# Patient Record
Sex: FEMALE | Race: WHITE | Employment: STUDENT | URBAN - NONMETROPOLITAN AREA
[De-identification: names, ages, dates, MRNs, and addresses within clinical notes are randomized per-mention and may not be internally consistent; named-entity substitution may affect disease eponyms.]

---

## 2023-02-01 ENCOUNTER — HOSPITAL ENCOUNTER (OUTPATIENT)
Dept: INFUSION THERAPY | Age: 28
Discharge: HOME | End: 2023-02-01

## 2023-02-01 DIAGNOSIS — Z84.81 FAMILY HISTORY OF GENE MUTATION: Primary | ICD-10-CM

## 2023-02-01 DIAGNOSIS — Z80.0 FAMILY HISTORY- STOMACH CANCER: ICD-10-CM

## 2023-02-01 DIAGNOSIS — Z80.0 FAMILY HISTORY OF COLON CANCER: ICD-10-CM

## 2023-02-01 NOTE — CONSULTS
Completed genetic evaluation on patient. Her mother recently tested positive for BRIP1 gene mutation. Veronica Bravo qualified for free family variant testing through the [facility]. Veronica Bravo will receive a full 81 gene panel free of charge. I collected a thorough family history from her and an order was placed in [address] [place] [address] Rd. Patient stated she will drop by out patient lab at [address] next week and she has a follow-up appointment scheduled with me on 3/6/23 to review her results.

## 2023-02-20 DIAGNOSIS — Z80.0 FAMILY HISTORY OF COLON CANCER: ICD-10-CM

## 2023-02-20 DIAGNOSIS — Z84.81 FAMILY HISTORY OF GENE MUTATION: ICD-10-CM

## 2023-02-20 DIAGNOSIS — Z80.0 FAMILY HISTORY- STOMACH CANCER: ICD-10-CM

## 2023-03-13 LAB
Lab: ABNORMAL
Lab: POSITIVE

## 2023-03-16 ENCOUNTER — HOSPITAL ENCOUNTER (OUTPATIENT)
Dept: INFUSION THERAPY | Age: 28
Discharge: HOME | End: 2023-03-16

## 2023-03-16 NOTE — CONSULTS
Completed a post-test results disclosure discussion with patient. 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 Patient was set up for further genetic counseling and breast health management with Jenny Brunner PA-C on 3/27/23 at 1000. Patient encouraged to take a copy of her results to her OB/GYN provider and also her PCP. We reviewed the cancer risks associated with the corresponding hereditary cancer predisposition syndrome and the summary of relevant medical management changes found on the Medical Management Tool (MMT). The MMT Table provides a current understanding of literature and guidelines. The management changes summarized in the MMT are based on professional guidelines (i.e. NCCN), but no specific medical management recommendations were made to the patient. We also reviewed family members that are at risk to carry the identified mutation as well as any applicable reproductive risk implications. I directed the patient to follow up with their healthcare provider, for next steps regarding their healthcare. Results are uploaded under the Lab Tab. Patient was given a letter to share with family members that want to be tested. If the family members contact me, they can be tested for free. Patient was also given a direct number to reach a [employer] and encouraged to call to set up an in depth televisit regarding their mutation. Patient did have one or more Variant(s) of Uncertain Significance (VUS). A variant of uncertain significance (VUS) means that a change in the DNA was detected, but there is not enough information to know if the change increases the risk of cancer or not. A VUS should not be used to make clinical decisions. As more evidence becomes available over  time, the lab may reclassify a VUS. Many VUS are reclassified as likely benign, meaning that they do not increase the risk for cancer.  Peterson French will reach out to the ordering provider if or when the VUS is reclassified. Patient voiced understanding of this.